# Patient Record
Sex: FEMALE | Race: WHITE
[De-identification: names, ages, dates, MRNs, and addresses within clinical notes are randomized per-mention and may not be internally consistent; named-entity substitution may affect disease eponyms.]

---

## 2017-10-24 ENCOUNTER — HOSPITAL ENCOUNTER (EMERGENCY)
Dept: HOSPITAL 25 - UCCORT | Age: 52
Discharge: HOME | End: 2017-10-24
Payer: COMMERCIAL

## 2017-10-24 VITALS — DIASTOLIC BLOOD PRESSURE: 76 MMHG | SYSTOLIC BLOOD PRESSURE: 105 MMHG

## 2017-10-24 DIAGNOSIS — R10.11: Primary | ICD-10-CM

## 2017-10-24 PROCEDURE — G0463 HOSPITAL OUTPT CLINIC VISIT: HCPCS

## 2017-10-24 PROCEDURE — 74176 CT ABD & PELVIS W/O CONTRAST: CPT

## 2017-10-24 PROCEDURE — 99211 OFF/OP EST MAY X REQ PHY/QHP: CPT

## 2017-10-24 PROCEDURE — 81003 URINALYSIS AUTO W/O SCOPE: CPT

## 2017-10-24 NOTE — UC
Abdominal Pain Female HPI





- HPI Summary


HPI Summary: 





RIGHT SIDE ABDOMINAL PAIN X 1 DAY 


PAIN IS ACHY, COMES AND GOES, 


PAIN IS SEVER 7 OUT OF 10 , 


NO FEVER, NO CHILLS, NO N/V/D/C 








- History of Current Complaint


Chief Complaint: UCAbdominalPain


Stated Complaint: RT SIDE PAIN


Time Seen by Provider: 10/24/17 13:31


Hx Obtained From: Patient


Hx Last Menstrual Period: 


Pregnant?: No


Onset/Duration: Gradual Onset, Lasting Days - 1, Still Present, Worse Since - 

THIS MORNING


Timing: Intermittent Episodes Lasting: - 10 SEC


Severity Initially: Moderate


Severity Currently: Severe


Location: Discrete At: [facility]


Radiates: No


Character: Aching


Aggravating Factor(s): Nothing


Alleviating Factor(s): Nothing


Associated Signs and Symptoms: Negative: Fever, Cough, Chest Pain, Dizzy, Back 

Pain, Constipation, Blood in Stool, Urinary Symptoms, Decreased Appetite, 

Vaginal Bleeding, Vaginal Discharge, Nausea, Vomiting, Diarrhea


Allergies/Adverse Reactions: 


 Allergies











Allergy/AdvReac Type Severity Reaction Status Date / Time


 


No Known Allergies Allergy   Verified 10/24/17 13:24














PMH/Surg Hx/FS Hx/Imm Hx


Previously Healthy: Yes





- Surgical History


Surgical History: Yes


Surgery Procedure, Year, and Place: .  dental surgery.  eptopic 

pregnancy- ovarian cyst.  appy





- Family History


Known Family History: Positive: None


   Negative: Diabetes





- Social History


Alcohol Use: Occasionally


Substance Use Type: None


Smoking Status (MU): Never Smoked Tobacco





- Immunization History


Most Recent Influenza Vaccination: not this season





Review of Systems


Constitutional: Negative


Skin: Negative


Eyes: Negative


ENT: Negative


Respiratory: Negative


Cardiovascular: Negative


Gastrointestinal: Abdominal Pain


Genitourinary: Negative


Is Patient Immunocompromised?: No


All Other Systems Reviewed And Are Negative: Yes





Physical Exam


Triage Information Reviewed: Yes


Appearance: Well-Appearing, Well-Nourished, Pain Distress


Vital Signs: 


 Initial Vital Signs











Temp  98.8 F   10/24/17 13:25


 


Pulse  68   10/24/17 13:25


 


Resp  16   10/24/17 13:25


 


BP  105/76   10/24/17 13:25


 


Pulse Ox  100   10/24/17 13:25











Vital Signs Reviewed: Yes


Eyes: Positive: Conjunctiva Clear


ENT Exam: Normal


ENT: Positive: Normal ENT inspection, Hearing grossly normal, Pharynx normal


Neck exam: Normal


Neck: Positive: Supple, Nontender, No Lymphadenopathy


Respiratory: Positive: Chest non-tender, Lungs clear, Normal breath sounds


Cardiovascular: Positive: RRR, No Murmur, Pulses Normal


Abdomen Description: Positive: Soft, Other: - TENDERNESS RUQ.  Negative: CVA 

Tenderness (R), CVA Tenderness (L), Distended, Guarding


Bowel Sounds: Positive: Present


Musculoskeletal Exam: Normal


Skin Exam: Normal, Other





Abd Pain Female Course/Dx





- Differential Dx/Diagnosis


Differential Diagnosis: Renal Colic


Provider Diagnoses: RUQ ABDOMINAL PAIN.  





Discharge





- Discharge Plan


Condition: Stable


Disposition: HOME


Patient Education Materials:  Abdominal Pain (ED)


Referrals: 


Sonam Marinelli [Primary Care Provider] - 3 Days


Additional Instructions: 


CT ABD : NEGATIVE FOR KIDNEY STONES


TAKE TYLENOL OF IBUPROFEN AS NEEDED FOR PAIN 


FOLLOW UP IF NOT BETTER IN 3 DAYS , SOONER IF GETTING WORSE


CALL THE OFFICE IF HAVING A RASH IN THE AREA , ? SHINGLES

## 2017-10-24 NOTE — RAD
INDICATION: Right flank pain     



COMPARISON: None

 

TECHNIQUE: Noncontrast axial source images were acquired from the level hemidiaphragms to

the symphysis pubis as part of CT imaging for renal stone.



Lung bases: The lung bases are clear.



Liver: The liver is normal in size. Noncontrast imaging shows no evidence of a hepatic

mass or ductal dilatation.



Gallbladder: There are no calcified gallstones. There is no evidence of wall thickening or

pericholecystic fluid..



Spleen: The spleen is normal in size. The noncontrast CT appearance is normal.



Pancreas: Noncontrast imaging shows no pancreatic mass or ductal dilitation.



Adrenal glands: No masses are identified.



Kidneys/Bladder: There is no evidence of nephrolithiasis or CT evidence of hydronephrosis.

Noncontrast imaging shows no evidence of a renal mass.   The bladder is unremarkable..



Adenopathy: There is no evidence of intraperitoneal or retroperitoneal adenopathy.

Evaluation is limited without oral contrast.



Fluid collections: There are no free or localized fluid collections.



Vessels: The aorta and iliac vessels are normal in caliber. There are no significant

atherosclerotic changes. The IVC appears normal 



Pelvic organs: Uterus and left adnexa are normal. There is a 2 cm right adnexal cyst



GI tract: Evaluation of the bowel is limited without oral contrast. The stomach, small

bowel, and lower GI tract appear grossly normal. There are no obstructive findings. The

appendix is visualized and appears normal.



Soft tissues: No soft tissue abnormalities of the extraperitoneal abdomen or pelvis are

identified.



Osseous structures: There are no acute osseous findings.



IMPRESSION:  NO CT EVIDENCE OF UROLITHIASIS. 2 CM RIGHT ADNEXAL CYST.

## 2019-04-04 ENCOUNTER — HOSPITAL ENCOUNTER (EMERGENCY)
Dept: HOSPITAL 25 - UCCORT | Age: 54
Discharge: HOME | End: 2019-04-04
Payer: COMMERCIAL

## 2019-04-04 VITALS — SYSTOLIC BLOOD PRESSURE: 98 MMHG | DIASTOLIC BLOOD PRESSURE: 67 MMHG

## 2019-04-04 DIAGNOSIS — R42: Primary | ICD-10-CM

## 2019-04-04 DIAGNOSIS — R09.89: ICD-10-CM

## 2019-04-04 DIAGNOSIS — R11.0: ICD-10-CM

## 2019-04-04 DIAGNOSIS — R51: ICD-10-CM

## 2019-04-04 PROCEDURE — 99211 OFF/OP EST MAY X REQ PHY/QHP: CPT

## 2019-04-04 PROCEDURE — G0463 HOSPITAL OUTPT CLINIC VISIT: HCPCS

## 2019-04-04 NOTE — UC
Dizzy HPI


HPI Summary: 


53-year-old woman comes in with a chief complaint of dizziness.  Patient has a 

history of migraines.  5 days ago she woke in the morning with a typical 

migraine with headache pain in the upper neck radiating up over the top of her 

head towards her forehead.  She had some nausea at the time but she also had a 

spinning dizziness.  Spinning dizziness was not typical of her migraines and 

she went to the emergency department where she had a head CT and EKG and was 

given a migraine cocktail.  Her headache improved while in the emergency 

department.  She reports her head CT and EKG were normal.  She was prescribed 

Zofran and meclizine.  Over the last several days her symptoms waxed and waned.

  There are times when she does not have the dizziness at all.  The dizziness 

is provoked with head movement or with being in a moving car.  It's alleviated 

with rest and not moving her head.  She has a frontal headache now is different 

than migraine headache.  Migraine headache is gone.  She has no difficulty with 

vision or speech or weakness or numbness.  Had a little bit of runny nose 

yesterday but no sinusitis symptoms.  She has had sinusitis in the past.  No 

ear pain.  No fevers or chills no chest pain no palpitations.  She has not 

taken any meclizine.  She took Zofran just prior to arrival because she was 

nauseous.





- History Of Current Complaint


Chief Complaint: UCDizziness


Stated Complaint: DIZZY,NAUSEA


Time Seen by Provider: 19 07:27


Hx Last Menstrual Period: 


Pain Intensity: 0





- Allergies/Home Medications


Allergies/Adverse Reactions: 


 Allergies











Allergy/AdvReac Type Severity Reaction Status Date / Time


 


No Known Allergies Allergy   Verified 19 07:16











Home Medications: 


 Home Medications





Meclizine TAB* [Antivert 12.5 TAB*] 25 mg PO TID PRN 19 [History 

Confirmed 19]


Ondansetron TAB* [Zofran 4 MG Tab*] 4 mg PO Q6H PRN 19 [History Confirmed 

19]











PMH/Surg Hx/FS Hx/Imm Hx


Previously Healthy: Yes


Neurological History: Migraine





- Surgical History


Surgical History: Yes


Surgery Procedure, Year, and Place: .  dental surgery.  eptopic 

pregnancy- ovarian cyst.  appy





- Family History


Known Family History: Positive: None


   Negative: Diabetes





- Social History


Alcohol Use: Occasionally


Substance Use Type: None


Smoking Status (MU): Never Smoked Tobacco





- Immunization History


Most Recent Influenza Vaccination: not this season





Review of Systems


All Other Systems Reviewed And Are Negative: Yes


Constitutional: Positive: Fatigue


Skin: Positive: Negative


Eyes: Positive: Other - NYSTAGMUS


ENT: Positive: Nasal Discharge, Sinus Congestion


Respiratory: Positive: Negative


Cardiovascular: Positive: Negative


Gastrointestinal: Positive: Nausea


Motor: Positive: Negative


Neurovascular: Positive: Negative


Musculoskeletal: Positive: Negative


Neurological: Positive: Headache, Other


Psychological: Positive: Negative


Is Patient Immunocompromised?: No





Physical Exam


Triage Information Reviewed: Yes


Appearance: No Pain Distress, Well-Nourished, Ill-Appearing - MILD


Vital Signs: 


 Initial Vital Signs











Temp  97.7 F   19 07:17


 


Pulse  59   19 07:17


 


Resp  18   19 07:17


 


BP  98/67   19 07:17


 


Pulse Ox  100   19 07:17











Vital Signs Reviewed: Yes


Eye Exam: Normal


Eyes: Positive: Conjunctiva Clear, Other: - PERRLA/EOMI. NO PHOTOPHOBIA. NO 

NYSTAGMUS ON EXAM.


ENT: Positive: Pharynx normal, TMs normal


Neck exam: Normal


Neck: Positive: Supple


Respiratory: Positive: Lungs clear, Normal breath sounds, No respiratory 

distress


Cardiovascular: Positive: RRR


Musculoskeletal Exam: Normal


Musculoskeletal: Positive: Strength Intact, ROM Intact


Neurological: Positive: Other: - On examination patient has no focal neurologic 

deficit.  Face is symmetric there is no drift in the arms or legs have full 

range of motion full-strength.  There is no visual field deficit found.  No 

difficulty with speech.  Finger-nose is normal heel-to-shin was normal.  

Patient does get a spinning dizziness when she moves to get from a chair to the 

table.  This resolves at rest.


Psychological Exam: Normal


Psychological: Positive: Normal Response To Family, Age Appropriate Behavior


Skin Exam: Normal





Dizzy Course/Dx





- Course


Course Of Treatment: 


The symptoms appear to be a peripheral vertigo.  Patient has no other 

neurologic deficits.  I recommended taking meclizine which the patient has not 

taken yet.  Plan will be she'll take it as long as she does improve she'll 

follow-up with her primary care doctor.  If she does not improve she needs to 

follow up with her primary care doctor or neurology.  I also discussed the 

signs and symptoms of a stroke and so that if symptoms do persist or they 

worsen and she has any neurologic deficits or nystagmus at rest she needs to 

get reevaluated in the emergency department.





- Differential Dx/Diagnosis


Provider Diagnosis: 


 Vertigo, Dizziness








Discharge





- Sign-Out/Discharge


Documenting (check all that apply): Patient Departure


All imaging exams completed and their final reports reviewed: No Studies





- Discharge Plan


Condition: Stable


Disposition: HOME


Patient Education Materials:  Vertigo (ED), Dizziness (ED)


Referrals: 


Jessica Lugo [Primary Care Provider] - 


Danis Guadalupe MD [Medical Doctor] - 


Additional Instructions: 


FOLLOW UP WITH YOUR PRIMARY CARE DOCTOR OR NEUROLOGY IF NOT COMPLETELY IMPROVED.


GO TO THE EMERGENCY DEPARTMENT FOR ANY WORSENING OF YOUR CONDITION; WEAKNESS, 

NUMBNESS, DIFFICULTY WITH VISION OR SPEECH, NYSTAGMUS AT REST OR ANY QUESTIONS 

OR CONCERNS.








- Billing Disposition and Condition


Condition: STABLE


Disposition: Home